# Patient Record
Sex: MALE | Race: WHITE | Employment: FULL TIME | URBAN - METROPOLITAN AREA
[De-identification: names, ages, dates, MRNs, and addresses within clinical notes are randomized per-mention and may not be internally consistent; named-entity substitution may affect disease eponyms.]

---

## 2017-06-08 ENCOUNTER — LAB ENCOUNTER (OUTPATIENT)
Dept: LAB | Age: 37
End: 2017-06-08
Attending: FAMILY MEDICINE
Payer: COMMERCIAL

## 2017-06-08 ENCOUNTER — OFFICE VISIT (OUTPATIENT)
Dept: FAMILY MEDICINE CLINIC | Facility: CLINIC | Age: 37
End: 2017-06-08

## 2017-06-08 VITALS
BODY MASS INDEX: 22.92 KG/M2 | DIASTOLIC BLOOD PRESSURE: 67 MMHG | HEART RATE: 59 BPM | SYSTOLIC BLOOD PRESSURE: 105 MMHG | WEIGHT: 153 LBS | HEIGHT: 68.5 IN

## 2017-06-08 DIAGNOSIS — Z00.00 ROUTINE MEDICAL EXAM: Primary | ICD-10-CM

## 2017-06-08 DIAGNOSIS — Z00.00 ROUTINE MEDICAL EXAM: ICD-10-CM

## 2017-06-08 PROCEDURE — 82607 VITAMIN B-12: CPT

## 2017-06-08 PROCEDURE — 85025 COMPLETE CBC W/AUTO DIFF WBC: CPT

## 2017-06-08 PROCEDURE — 80061 LIPID PANEL: CPT

## 2017-06-08 PROCEDURE — 36415 COLL VENOUS BLD VENIPUNCTURE: CPT

## 2017-06-08 PROCEDURE — 80053 COMPREHEN METABOLIC PANEL: CPT

## 2017-06-08 PROCEDURE — 82306 VITAMIN D 25 HYDROXY: CPT

## 2017-06-08 PROCEDURE — 84443 ASSAY THYROID STIM HORMONE: CPT

## 2017-06-08 PROCEDURE — 99395 PREV VISIT EST AGE 18-39: CPT | Performed by: FAMILY MEDICINE

## 2017-06-08 NOTE — PROGRESS NOTES
Latanya Hull is a 44year old male who presents for a complete physical exam.   HPI:   Pt here for first visit. Reports not using the inhalers for the asthma for 3 years. Reports no issues for some time.  Reports exercising regularly and eating health bruising  ENDOCRINE: denies weight changes  ALL/ASTHMA: denies hx of allergy or asthma    EXAM:   /67 mmHg  Pulse 59  Ht 5' 8.5\" (1.74 m)  Wt 153 lb (69.4 kg)  BMI 22.92 kg/m2    GENERAL: well developed, well nourished,in no apparent distress  SKIN:

## 2017-06-15 NOTE — PROGRESS NOTES
Quick Note:    Bilirubin still a bit elevated but actually better than last check. Normal vitamin levels. Normal cholesterol. Normal thyroid.  Glucose and kidney function normal. Overall looks good. - Dr. Michael King  ______

## 2017-06-21 ENCOUNTER — TELEPHONE (OUTPATIENT)
Dept: FAMILY MEDICINE CLINIC | Facility: CLINIC | Age: 37
End: 2017-06-21

## 2017-06-21 NOTE — TELEPHONE ENCOUNTER
Entered by Rachel Harris MD at 6/15/2017 11:04 AM        Read by Marjorie Tran at 6/21/2017 12:49 PM       Bilirubin still a bit elevated but actually better than last check. Normal vitamin levels. Normal cholesterol. Normal thyroid.  Glucose and k

## (undated) NOTE — MR AVS SNAPSHOT
[address], [address]  [address] [facility]  [phone]               Thank you for choosing us for your health care visit with Flower Arambula MD.  We are glad to serve you and happy to provide you with this summa MyChart     Visit Happy [facility]  You can access your MyChart to more actively manage your health care and view more details from this visit by going to [email or web address].   If you've recently had a stay at the Hospital you can access your disc